# Patient Record
Sex: MALE | Employment: FULL TIME | URBAN - METROPOLITAN AREA
[De-identification: names, ages, dates, MRNs, and addresses within clinical notes are randomized per-mention and may not be internally consistent; named-entity substitution may affect disease eponyms.]

---

## 2020-08-03 RX ORDER — FOLIC ACID 1 MG/1
1 TABLET ORAL DAILY
COMMUNITY

## 2020-08-03 RX ORDER — IBUPROFEN 200 MG
200 TABLET ORAL EVERY 6 HOURS PRN
COMMUNITY

## 2020-08-10 ENCOUNTER — ANESTHESIA EVENT (OUTPATIENT)
Dept: RADIATION ONCOLOGY | Age: 35
End: 2020-08-10

## 2020-08-11 ENCOUNTER — ANESTHESIA (OUTPATIENT)
Dept: RADIATION ONCOLOGY | Age: 35
End: 2020-08-11

## 2020-08-11 ENCOUNTER — HOSPITAL ENCOUNTER (OUTPATIENT)
Dept: RADIATION ONCOLOGY | Age: 35
Discharge: HOME | End: 2020-08-11
Payer: COMMERCIAL

## 2020-08-11 ENCOUNTER — HOSPITAL ENCOUNTER (OUTPATIENT)
Dept: CT IMAGING | Age: 35
Discharge: HOME | End: 2020-08-11
Payer: COMMERCIAL

## 2020-08-11 ENCOUNTER — HOSPITAL ENCOUNTER (OUTPATIENT)
Dept: MRI IMAGING | Age: 35
Discharge: HOME | End: 2020-08-11
Payer: COMMERCIAL

## 2020-08-11 VITALS
DIASTOLIC BLOOD PRESSURE: 76 MMHG | RESPIRATION RATE: 18 BRPM | TEMPERATURE: 98.7 F | HEART RATE: 76 BPM | HEIGHT: 75 IN | SYSTOLIC BLOOD PRESSURE: 140 MMHG | BODY MASS INDEX: 23.87 KG/M2 | WEIGHT: 192 LBS | OXYGEN SATURATION: 99 %

## 2020-08-11 VITALS — SYSTOLIC BLOOD PRESSURE: 122 MMHG | RESPIRATION RATE: 12 BRPM | DIASTOLIC BLOOD PRESSURE: 70 MMHG | TEMPERATURE: 96.8 F

## 2020-08-11 PROCEDURE — 77371 SRS MULTISOURCE: CPT

## 2020-08-11 PROCEDURE — 2580000003 HC RX 258: Performed by: ANESTHESIOLOGY

## 2020-08-11 PROCEDURE — 7100000011 HC PHASE II RECOVERY - ADDTL 15 MIN

## 2020-08-11 PROCEDURE — 77334 RADIATION TREATMENT AID(S): CPT

## 2020-08-11 PROCEDURE — 7100000010 HC PHASE II RECOVERY - FIRST 15 MIN

## 2020-08-11 PROCEDURE — 3700000000 HC ANESTHESIA ATTENDED CARE

## 2020-08-11 PROCEDURE — 2500000003 HC RX 250 WO HCPCS: Performed by: NURSE ANESTHETIST, CERTIFIED REGISTERED

## 2020-08-11 PROCEDURE — 6360000004 HC RX CONTRAST MEDICATION: Performed by: NEUROLOGICAL SURGERY

## 2020-08-11 PROCEDURE — 70553 MRI BRAIN STEM W/O & W/DYE: CPT

## 2020-08-11 PROCEDURE — 77300 RADIATION THERAPY DOSE PLAN: CPT

## 2020-08-11 PROCEDURE — 77336 RADIATION PHYSICS CONSULT: CPT

## 2020-08-11 PROCEDURE — 70460 CT HEAD/BRAIN W/DYE: CPT

## 2020-08-11 PROCEDURE — 3700000001 HC ADD 15 MINUTES (ANESTHESIA)

## 2020-08-11 PROCEDURE — 2580000003 HC RX 258: Performed by: NEUROLOGICAL SURGERY

## 2020-08-11 PROCEDURE — A9576 INJ PROHANCE MULTIPACK: HCPCS | Performed by: NEUROLOGICAL SURGERY

## 2020-08-11 PROCEDURE — 2500000003 HC RX 250 WO HCPCS: Performed by: NEUROLOGICAL SURGERY

## 2020-08-11 PROCEDURE — 6360000002 HC RX W HCPCS: Performed by: NEUROLOGICAL SURGERY

## 2020-08-11 PROCEDURE — 77295 3-D RADIOTHERAPY PLAN: CPT

## 2020-08-11 PROCEDURE — 6360000002 HC RX W HCPCS: Performed by: NURSE ANESTHETIST, CERTIFIED REGISTERED

## 2020-08-11 RX ORDER — SODIUM CHLORIDE 0.9 % (FLUSH) 0.9 %
10 SYRINGE (ML) INJECTION PRN
Status: DISCONTINUED | OUTPATIENT
Start: 2020-08-11 | End: 2020-08-12 | Stop reason: HOSPADM

## 2020-08-11 RX ORDER — SODIUM CHLORIDE, SODIUM LACTATE, POTASSIUM CHLORIDE, CALCIUM CHLORIDE 600; 310; 30; 20 MG/100ML; MG/100ML; MG/100ML; MG/100ML
INJECTION, SOLUTION INTRAVENOUS CONTINUOUS
Status: DISCONTINUED | OUTPATIENT
Start: 2020-08-11 | End: 2020-08-12 | Stop reason: HOSPADM

## 2020-08-11 RX ORDER — ADALIMUMAB 40MG/0.4ML
40 KIT SUBCUTANEOUS
COMMUNITY

## 2020-08-11 RX ORDER — LIDOCAINE HYDROCHLORIDE 10 MG/ML
30 INJECTION, SOLUTION EPIDURAL; INFILTRATION; INTRACAUDAL; PERINEURAL ONCE
Status: COMPLETED | OUTPATIENT
Start: 2020-08-11 | End: 2020-08-11

## 2020-08-11 RX ORDER — DEXAMETHASONE SODIUM PHOSPHATE 4 MG/ML
4 INJECTION, SOLUTION INTRA-ARTICULAR; INTRALESIONAL; INTRAMUSCULAR; INTRAVENOUS; SOFT TISSUE ONCE
Status: COMPLETED | OUTPATIENT
Start: 2020-08-11 | End: 2020-08-11

## 2020-08-11 RX ORDER — SODIUM BICARBONATE 42 MG/ML
1.5 INJECTION, SOLUTION INTRAVENOUS ONCE
Status: COMPLETED | OUTPATIENT
Start: 2020-08-11 | End: 2020-08-11

## 2020-08-11 RX ORDER — BUPIVACAINE HYDROCHLORIDE 5 MG/ML
30 INJECTION, SOLUTION EPIDURAL; INTRACAUDAL ONCE
Status: COMPLETED | OUTPATIENT
Start: 2020-08-11 | End: 2020-08-11

## 2020-08-11 RX ORDER — LORAZEPAM 2 MG/ML
1 INJECTION INTRAMUSCULAR ONCE
Status: COMPLETED | OUTPATIENT
Start: 2020-08-11 | End: 2020-08-11

## 2020-08-11 RX ORDER — BACITRACIN, NEOMYCIN, POLYMYXIN B 400; 3.5; 5 [USP'U]/G; MG/G; [USP'U]/G
OINTMENT TOPICAL ONCE
Status: DISCONTINUED | OUTPATIENT
Start: 2020-08-11 | End: 2020-08-12 | Stop reason: HOSPADM

## 2020-08-11 RX ORDER — ONDANSETRON 2 MG/ML
4 INJECTION INTRAMUSCULAR; INTRAVENOUS ONCE
Status: COMPLETED | OUTPATIENT
Start: 2020-08-11 | End: 2020-08-11

## 2020-08-11 RX ORDER — LIDOCAINE HYDROCHLORIDE 20 MG/ML
INJECTION, SOLUTION INFILTRATION; PERINEURAL PRN
Status: DISCONTINUED | OUTPATIENT
Start: 2020-08-11 | End: 2020-08-11 | Stop reason: SDUPTHER

## 2020-08-11 RX ORDER — SODIUM CHLORIDE 9 MG/ML
INJECTION, SOLUTION INTRAVENOUS CONTINUOUS
Status: DISCONTINUED | OUTPATIENT
Start: 2020-08-11 | End: 2020-08-12 | Stop reason: HOSPADM

## 2020-08-11 RX ORDER — TRAZODONE HYDROCHLORIDE 50 MG/1
50 TABLET ORAL NIGHTLY PRN
COMMUNITY

## 2020-08-11 RX ORDER — FENTANYL CITRATE 50 UG/ML
INJECTION, SOLUTION INTRAMUSCULAR; INTRAVENOUS PRN
Status: DISCONTINUED | OUTPATIENT
Start: 2020-08-11 | End: 2020-08-11 | Stop reason: SDUPTHER

## 2020-08-11 RX ORDER — PROPOFOL 10 MG/ML
INJECTION, EMULSION INTRAVENOUS PRN
Status: DISCONTINUED | OUTPATIENT
Start: 2020-08-11 | End: 2020-08-11 | Stop reason: SDUPTHER

## 2020-08-11 RX ORDER — 0.9 % SODIUM CHLORIDE 0.9 %
500 INTRAVENOUS SOLUTION INTRAVENOUS ONCE
Status: COMPLETED | OUTPATIENT
Start: 2020-08-11 | End: 2020-08-11

## 2020-08-11 RX ORDER — METHYLPREDNISOLONE 4 MG/1
4 TABLET ORAL SEE ADMIN INSTRUCTIONS
COMMUNITY

## 2020-08-11 RX ORDER — LORAZEPAM 2 MG/ML
0.5 INJECTION INTRAMUSCULAR ONCE
Status: DISCONTINUED | OUTPATIENT
Start: 2020-08-11 | End: 2020-08-11

## 2020-08-11 RX ORDER — SODIUM CHLORIDE 0.9 % (FLUSH) 0.9 %
10 SYRINGE (ML) INJECTION EVERY 12 HOURS SCHEDULED
Status: DISCONTINUED | OUTPATIENT
Start: 2020-08-11 | End: 2020-08-12 | Stop reason: HOSPADM

## 2020-08-11 RX ADMIN — BUPIVACAINE HYDROCHLORIDE 150 MG: 5 INJECTION, SOLUTION EPIDURAL; INTRACAUDAL; PERINEURAL at 08:15

## 2020-08-11 RX ADMIN — LORAZEPAM 1 MG: 2 INJECTION INTRAMUSCULAR; INTRAVENOUS at 12:43

## 2020-08-11 RX ADMIN — ONDANSETRON 4 MG: 2 INJECTION INTRAMUSCULAR; INTRAVENOUS at 06:29

## 2020-08-11 RX ADMIN — PROPOFOL 50 MG: 10 INJECTION, EMULSION INTRAVENOUS at 08:01

## 2020-08-11 RX ADMIN — PROPOFOL 50 MG: 10 INJECTION, EMULSION INTRAVENOUS at 08:13

## 2020-08-11 RX ADMIN — GADOTERIDOL 20 ML: 279.3 INJECTION, SOLUTION INTRAVENOUS at 09:55

## 2020-08-11 RX ADMIN — SODIUM BICARBONATE 1.5 MEQ: 42 INJECTION, SOLUTION INTRAVENOUS at 08:15

## 2020-08-11 RX ADMIN — LIDOCAINE HYDROCHLORIDE 50 MG: 20 INJECTION, SOLUTION INFILTRATION; PERINEURAL at 08:10

## 2020-08-11 RX ADMIN — BUPIVACAINE HYDROCHLORIDE 150 MG: 5 INJECTION, SOLUTION EPIDURAL; INTRACAUDAL; PERINEURAL at 12:42

## 2020-08-11 RX ADMIN — FENTANYL CITRATE 25 MCG: 50 INJECTION, SOLUTION INTRAMUSCULAR; INTRAVENOUS at 08:03

## 2020-08-11 RX ADMIN — IOPAMIDOL 80 ML: 755 INJECTION, SOLUTION INTRAVENOUS at 10:52

## 2020-08-11 RX ADMIN — DEXAMETHASONE SODIUM PHOSPHATE 4 MG: 4 INJECTION, SOLUTION INTRA-ARTICULAR; INTRALESIONAL; INTRAMUSCULAR; INTRAVENOUS; SOFT TISSUE at 12:31

## 2020-08-11 RX ADMIN — SODIUM BICARBONATE 1.5 MEQ: 42 INJECTION, SOLUTION INTRAVENOUS at 12:42

## 2020-08-11 RX ADMIN — Medication 10 ML: at 08:31

## 2020-08-11 RX ADMIN — PROPOFOL 50 MG: 10 INJECTION, EMULSION INTRAVENOUS at 08:05

## 2020-08-11 RX ADMIN — PROPOFOL 50 MG: 10 INJECTION, EMULSION INTRAVENOUS at 08:09

## 2020-08-11 RX ADMIN — SODIUM CHLORIDE: 9 INJECTION, SOLUTION INTRAVENOUS at 07:58

## 2020-08-11 RX ADMIN — LIDOCAINE HYDROCHLORIDE 30 ML: 10 INJECTION, SOLUTION EPIDURAL; INFILTRATION; INTRACAUDAL; PERINEURAL at 08:15

## 2020-08-11 RX ADMIN — LIDOCAINE HYDROCHLORIDE 30 ML: 10 INJECTION, SOLUTION EPIDURAL; INFILTRATION; INTRACAUDAL; PERINEURAL at 12:41

## 2020-08-11 RX ADMIN — SODIUM CHLORIDE 500 ML: 9 INJECTION, SOLUTION INTRAVENOUS at 06:29

## 2020-08-11 RX ADMIN — LIDOCAINE HYDROCHLORIDE 50 MG: 20 INJECTION, SOLUTION INFILTRATION; PERINEURAL at 08:03

## 2020-08-11 RX ADMIN — PROPOFOL 50 MG: 10 INJECTION, EMULSION INTRAVENOUS at 08:18

## 2020-08-11 ASSESSMENT — PAIN SCALES - GENERAL: PAINLEVEL_OUTOF10: 5

## 2020-08-11 NOTE — OP NOTE
1 [facility]  PATIENT NAME:   Manjula Carrillo   MR #:  [id number]  YOB: 1985   ACCOUNT #:  [de-identified]  SURGEON:  Joey Iqbal M.D. ADMIT DATE:  8/11/2020  SERVICE:  Neurosurgery  DICTATED BY: Joey Iqbal M.D. SURGERY DATE:  8/11/2020           OPERATIVE REPORT       PREOPERATIVE DIAGNOSIS:     1. Left vestibular schwannoma (recurrent)     POSTOPERATIVE DIAGNOSIS:     1. Left vestibular schwannoma (recurrent)    PROCEDURE(S) PERFORMED:    1. Placement of stereotactic head frame   2. Gamma Knife radiosurgery for left vestibular schwannoma    NEUROSURGEON:  Joey Iqbal MD      RADIATION ONCOLOGIST: Jessica Winters MD    ANESTHESIA:  Moderate sedation    COMPLICATIONS:  None    INDICATIONS: This is a 18-year-old man with a left vestibular schwannoma who underwent suboccipital craniotomy and near-total resection by Dr. Merissa Shook on 5/31/17 (no hearing postop). He has been followed expectantly and the small tumor remnant has slowly enlarged. We discussed various treatment options but ultimately recommended Gamma Knife radiosurgery. The patient was aware of the potential benefits in terms of tumor control and the possible risks including (but not limited to): pin site bleeding/swelling/infection, facial weakness, facial numbness, brain swelling, new neurological symptoms, radiation injury (necrosis) of the brain, and/or secondary tumor formation. PERFORMANCE STATUS: 90%    DETAILS OF PROCEDURE: The four pin sites were cleaned with Chloraprep and injected with a mixture of Lidocaine 1%, Marcaine 0.5%, and sodium bicarbonate. The stereotactic head frame was secured with titanium screws. The patient underwent a stereotactic CT and MRI scans with contrast. These images were sent over the network to the [address] [place] [address]. The target and critical structures were contoured.  An optimal treatment plan was developed by the neurosurgeon, radiation oncologist, and medical physicist.    A cone-beam CT scan was performed in the treatment position to confirm frame stability. The patient then underwent Gamma Knife radiosurgery using the following parameters:    Target Size (cm) Shape Shots Dose (Gy) Isodose (%)   Left vestibular schwannoma 1.53 Oval 7 13 50     The patient tolerated the procedure without difficulty and the stereotactic frame was removed uneventfully. The patient was instructed on pin site care and medications.     SPECIMENS REMOVED: None    ESTIMATED BLOOD LOSS: None    TOTAL TIME SPENT WITH PATIENT: In conformance with CMS regulations, I affirm that I was present for the entire neurosurgical portion of the procedure including stereotactic frame placement, target definition, development of the treatment plan, treatment delivery, and stereotactic frame removal.     Jeri Briseno MD

## 2020-08-11 NOTE — PROGRESS NOTES
Checklist?  Yes    9. Does the patient require a pregnancy test per [address]? no     -If yes, the result was:  na    10. Are all present in agreement? Yes    Those present for time out:  Anisha Andrade RN. Dr. Gopal Morton, [address] [facility]  Patient received MAC under the supervision of Dr. MEDEL and Gina Pederson CRNA. This RN was present throughout [place] [address] and assumed care from anesthesia for Phase II recovery. The patient is awake and breathing easily. Patient denies pain. See Flow Sheet for vitals and Mark Score. Anisha Andrade RN      2911  After G-frame placement, patient was taken to MRI and CT by this RN where SpO2 and pulse were monitored and remained stable throughout. Patient tolerated the study well. Clara Maria RN      0481   Pt c/o pain 5/10 to posterior pin sites. Additional anesthetic injected by Dr. Gopal Morton to both posterior sites. Pt stated he felt relief shortly after. [facility] Time Out    1. Patient states name and birthdate correctly? Yes    2. Procedure listed on consent? Stereotactic Radiosurgery, Gamma Knife for LEFT Acoustic Neuroma    3. Is this the correct procedure? Yes    4. Does the patient have only one benign target or lesion? Yes    -If yes, what side are we treating? left    -If yes, is the side marked for laterality? yes    5. Has the final radiologist report been reviewed? yes    6. Has the patient received IV Dexamethasone prior to radiation delivery? yes    7. Does the patient require Keppra or Ativan prior to treatment delivery? Yes, ativan for sedation    8. Have the pin torques been rechecked? yes    9. Is a CBCT required prior to treatment delivery? No    10. Are all present in agreement? Yes    11. Those present for time out:  Christa Martinez MP, Dr. Gopal Morton, Anisha Andrade, [address]  Patient brought to [facility] and placed on treatment couch.  Patient instructed to perform ankle pumps during treatment. AV monitoring in place and verified verbally with patient from console. Continuous SpO2 and pulse monitor visible and monitored by this RN. 1345  After Gamma Knife procedure, the G-frame was removed by Clara ALMENDAREZ and Kelsea Francis RN and fixation pin sites were observed for bleeding. None was noted. Pin sites were cleaned with alcohol and povidone-iodine. Dr. Gracy Rosado then placed sutures at all four pin sites. Patient met Phase II discharge criteria. Baseline neurological status unchanged. See discharge Mark score and vitals. Discharge instructions were reviewed with patient and girlfriend, Terril Scheuermann, who verbalized understanding using teach back method. Pt instructed to follow Medrol Dose Pack steroid taper as prescribed. Patient will schedule follow up appointment in 6 months. Patient was accompanied to transportation by this RN.     Javi Rodriguez RN

## 2020-08-11 NOTE — PROGRESS NOTES
[facility]  GAMMA KNIFE RADIOSURGERY  PROCEDURE REPORT    PROCEDURE DATE:  8/11/2020              Gamma Knife Radiosurgery Procedure Note     Diagnosis: Recurrent left sided vestibular schwannoma    Description of procedure:     Juan Miguel Mendez was met at the [facility] today by Dr. Shirley Troy and myself. Conscious sedation was accomplished by anesthesia and frame placement was completed by Dr. Shirley Troy. A stereotactic MRI of the brain with double-dose contrast was then completed. The left sided vestibular schwannoma was identified and targeted. Dosimetry was reviewed by Dr. Jovon Hutchinson MS (special physics consult requested) and myself. Treatment was then given successfully. The frame was removed without complication. The patient was discharged home in stable condition with medication instructions and a follow appointment.     Vlad Thacker MD

## 2020-08-11 NOTE — ANESTHESIA PRE PROCEDURE
Department of Anesthesiology  Preprocedure Note       Name:  Concha Andrade   Age:  38 y.o.  :  1985                                          MRN:  [id number]         Date:  2020      Surgeon: * No surgeons listed *    Procedure: * No procedures listed *    Medications prior to admission:   Prior to Admission medications    Medication Sig Start Date End Date Taking? Authorizing Provider   Adalimumab (HUMIRA) 40 MG/0.4ML PSKT Inject 40 mg into the skin every 14 days   Yes Historical Provider, MD   traZODone (DESYREL) 50 MG tablet Take 50 mg by mouth nightly as needed for Sleep   Yes Historical Provider, MD   methylPREDNISolone (MEDROL DOSEPACK) 4 MG tablet Take 4 mg by mouth See Admin Instructions Take by mouth. Yes Historical Provider, MD   folic acid (FOLVITE) 1 MG tablet Take 1 mg by mouth daily   Yes Historical Provider, MD   ibuprofen (ADVIL;MOTRIN) 200 MG tablet Take 200 mg by mouth every 6 hours as needed for Pain   Yes Historical Provider, MD       Current medications:    Current Outpatient Medications   Medication Sig Dispense Refill    Adalimumab (HUMIRA) 40 MG/0.4ML PSKT Inject 40 mg into the skin every 14 days      traZODone (DESYREL) 50 MG tablet Take 50 mg by mouth nightly as needed for Sleep      methylPREDNISolone (MEDROL DOSEPACK) 4 MG tablet Take 4 mg by mouth See Admin Instructions Take by mouth.       folic acid (FOLVITE) 1 MG tablet Take 1 mg by mouth daily      ibuprofen (ADVIL;MOTRIN) 200 MG tablet Take 200 mg by mouth every 6 hours as needed for Pain       Current Facility-Administered Medications   Medication Dose Route Frequency Provider Last Rate Last Dose    bupivacaine (PF) (MARCAINE) 0.5 % injection 150 mg  30 mL Intradermal Once Carlos Enrique Wise MD        Dexamethasone Sodium Phosphate injection 4 mg  4 mg Intravenous Once Carlos Enrique Wise MD        lidocaine PF 1 % injection 30 mL  30 mL Intradermal Once Carlos Enrique Wise MD        sodium bicarbonate 4.2 % injection 1.5 mEq  1.5 mEq Intradermal Once Destini Thornton MD        sodium chloride flush 0.9 % injection 10 mL  10 mL Intravenous 2 times per day Aida Perez DO        sodium chloride flush 0.9 % injection 10 mL  10 mL Intravenous PRN Aida Perez DO        0.9 % sodium chloride infusion   Intravenous Continuous Aida Perez DO        lactated ringers infusion   Intravenous Continuous Aida Perez DO        neomycin-bacitracin-polymyxin (NEOSPORIN) ointment   Topical Once Destini Thornton MD           Allergies: Allergies   Allergen Reactions    Other      BEES    Ultram [Tramadol Hcl]        Problem List:  There is no problem list on file for this patient. Past Medical History:  History reviewed. No pertinent past medical history. Past Surgical History:        Procedure Laterality Date    CRANIOTOMY Left     schwannoma       Social History:    Social History     Tobacco Use    Smoking status: Unknown If Ever Smoked   Substance Use Topics    Alcohol use: Not on file                                Counseling given: Not Answered      Vital Signs (Current):   Vitals:    08/11/20 0559 08/11/20 0607   BP:  128/78   Pulse:  82   Resp:  18   Temp:  97.5 °F (36.4 °C)   TempSrc:  Skin   SpO2:  96%   Weight: 192 lb (87.1 kg)    Height:  6' 3\" (1.905 m)                                              BP Readings from Last 3 Encounters:   08/11/20 128/78       NPO Status:                                                                                 BMI:   Wt Readings from Last 3 Encounters:   08/11/20 192 lb (87.1 kg)     Body mass index is 24 kg/m². CBC: No results found for: WBC, RBC, HGB, HCT, MCV, RDW, PLT    CMP: No results found for: NA, K, CL, CO2, BUN, CREATININE, GFRAA, AGRATIO, LABGLOM, GLUCOSE, PROT, CALCIUM, BILITOT, ALKPHOS, AST, ALT    POC Tests: No results for input(s): POCGLU, POCNA, POCK, POCCL, POCBUN, POCHEMO, POCHCT in the last 72 hours.     Coags: No results found for: PROTIME, INR, APTT    HCG (If Applicable): No results found for: PREGTESTUR, PREGSERUM, HCG, HCGQUANT     ABGs: No results found for: PHART, PO2ART, SZB8SUN, DCO4ZAN, BEART, [id number]     Type & Screen (If Applicable):  No results found for: LABABO, LABRH    Drug/Infectious Status (If Applicable):  No results found for: HIV, HEPCAB    COVID-19 Screening (If Applicable): No results found for: COVID19      Anesthesia Evaluation  Patient summary reviewed and Nursing notes reviewed no history of anesthetic complications:   Airway: Mallampati: II  TM distance: >3 FB   Neck ROM: full  Mouth opening: > = 3 FB Dental:          Pulmonary:Negative Pulmonary ROS                              Cardiovascular:Negative CV ROS                      Neuro/Psych:                ROS comment: Left-sided acoustic neuroma GI/Hepatic/Renal: Neg GI/Hepatic/Renal ROS            Endo/Other: Negative Endo/Other ROS                    Abdominal:           Vascular: negative vascular ROS. Anesthesia Plan      MAC     ASA 3    (53-year-old male presents for application of G-frame for gamma knife radiation procedure. Plan MAC with ASA standard monitors. Questions answered. Patient agreeable with anesthetic plan.  )  Induction: intravenous. Anesthetic plan and risks discussed with patient. Plan discussed with CRNA.     Attending anesthesiologist reviewed and agrees with Pre Eval content              Kerri Magana MD   8/11/2020

## 2020-08-11 NOTE — H&P
There have been no changes in the patient's condition since the history and physical.    Isra Null.  Popeye Hooper MD

## 2020-08-11 NOTE — ANESTHESIA POSTPROCEDURE EVALUATION
Department of Anesthesiology  Postprocedure Note    Patient: Kal Chowdhury  MRN: [id number]  YOB: 1985  Date of evaluation: 8/11/2020  Time:  4:18 PM     Procedure Summary     Date:  08/11/20 Room / Location:  [address] [place] [address]    Anesthesia Start:  0758 Anesthesia Stop:  0825    Procedure:  Chet Husbands W ANESTHESIA Diagnosis:  Benign neoplasm of cranial nerves    Scheduled Providers:  Amanda Oconnor MD Responsible Provider:  Amanda Oconnor MD    Anesthesia Type:  MAC ASA Status:  2          Anesthesia Type: MAC    Mark Phase I:      Mark Phase II:      Last vitals: Reviewed and per EMR flowsheets.        Anesthesia Post Evaluation    Patient location during evaluation: PACU  Patient participation: complete - patient participated  Level of consciousness: awake and alert  Pain score: 0  Airway patency: patent  Nausea & Vomiting: no nausea and no vomiting  Complications: no  Cardiovascular status: hemodynamically stable  Respiratory status: acceptable  Hydration status: euvolemic

## 2020-08-25 ENCOUNTER — HOSPITAL ENCOUNTER (OUTPATIENT)
Dept: RADIATION ONCOLOGY | Age: 35
Discharge: HOME | End: 2020-08-25
Payer: COMMERCIAL

## 2020-08-25 ENCOUNTER — PRE-PROCEDURE TELEPHONE (OUTPATIENT)
Dept: RADIATION ONCOLOGY | Age: 35
End: 2020-08-25

## 2020-08-25 NOTE — PROGRESS NOTES
Two week follow-up phone call with patient has been completed. 1. Pt denies any issues with the pin sites, states that the sutures have almost dissolved. 2. Successfully completed steroid taper with no issues  3. The pt stated that his pin sites are \"itchy\" and \"burny\" He denies any redness, swelling or drainage. He also said that he periodically gets pin point pain to other parts of his skull that can last a 2-30 seconds. Nothing specific triggers them and nothing but time alleviates them. They happen several times a day. \"it feels like someone is poking me with a push pin. \"  I informed him that I would let Dr. Ale Lee know and will follow up with the patient afterwards. 4.   Has been reminded of their next appointment with Dr. Ale Lee in 6 months. Thank you for referring this patient to the [facility] Department for treatment, it has been a pleasure to participate in their care. If the patient requires future Gamma Knife procedures please contact the department directly at [phone].

## 2021-03-25 ENCOUNTER — HOSPITAL ENCOUNTER (OUTPATIENT)
Dept: MRI IMAGING | Age: 36
Discharge: HOME | End: 2021-03-25
Payer: COMMERCIAL

## 2021-03-25 DIAGNOSIS — D36.10 SCHWANNOMA: ICD-10-CM

## 2021-03-25 PROCEDURE — 70553 MRI BRAIN STEM W/O & W/DYE: CPT

## 2021-03-25 PROCEDURE — 6360000004 HC RX CONTRAST MEDICATION: Performed by: NEUROLOGICAL SURGERY

## 2021-03-25 PROCEDURE — A9576 INJ PROHANCE MULTIPACK: HCPCS | Performed by: NEUROLOGICAL SURGERY

## 2021-03-25 RX ADMIN — GADOTERIDOL 19 ML: 279.3 INJECTION, SOLUTION INTRAVENOUS at 16:36

## 2021-09-23 ENCOUNTER — HOSPITAL ENCOUNTER (OUTPATIENT)
Dept: MRI IMAGING | Age: 36
Discharge: HOME | End: 2021-09-23
Payer: COMMERCIAL

## 2021-09-23 DIAGNOSIS — D33.3 BENIGN NEOPLASM OF CRANIAL NERVES (HCC): ICD-10-CM

## 2021-09-23 PROCEDURE — 70553 MRI BRAIN STEM W/O & W/DYE: CPT

## 2021-09-23 PROCEDURE — 6360000004 HC RX CONTRAST MEDICATION: Performed by: NEUROLOGICAL SURGERY

## 2021-09-23 PROCEDURE — A9576 INJ PROHANCE MULTIPACK: HCPCS | Performed by: NEUROLOGICAL SURGERY

## 2021-09-23 RX ADMIN — GADOTERIDOL 19 ML: 279.3 INJECTION, SOLUTION INTRAVENOUS at 16:09

## 2022-09-22 ENCOUNTER — HOSPITAL ENCOUNTER (OUTPATIENT)
Dept: MRI IMAGING | Age: 37
Discharge: HOME | End: 2022-09-22
Payer: COMMERCIAL

## 2022-09-22 DIAGNOSIS — D33.3 ACOUSTIC NEUROMA (HCC): ICD-10-CM

## 2022-09-22 PROCEDURE — A9579 GAD-BASE MR CONTRAST NOS,1ML: HCPCS | Performed by: NEUROLOGICAL SURGERY

## 2022-09-22 PROCEDURE — 70553 MRI BRAIN STEM W/O & W/DYE: CPT

## 2022-09-22 PROCEDURE — 6360000004 HC RX CONTRAST MEDICATION: Performed by: NEUROLOGICAL SURGERY

## 2022-09-22 RX ADMIN — GADOTERIDOL 19 ML: 279.3 INJECTION, SOLUTION INTRAVENOUS at 15:13

## 2023-09-28 ENCOUNTER — HOSPITAL ENCOUNTER (OUTPATIENT)
Dept: MRI IMAGING | Age: 38
Discharge: HOME | End: 2023-09-28
Attending: NEUROLOGICAL SURGERY
Payer: COMMERCIAL

## 2023-09-28 DIAGNOSIS — D36.10 SCHWANNOMA: ICD-10-CM

## 2023-09-28 PROCEDURE — 6360000004 HC RX CONTRAST MEDICATION: Performed by: NEUROLOGICAL SURGERY

## 2023-09-28 PROCEDURE — A9579 GAD-BASE MR CONTRAST NOS,1ML: HCPCS | Performed by: NEUROLOGICAL SURGERY

## 2023-09-28 PROCEDURE — 70553 MRI BRAIN STEM W/O & W/DYE: CPT

## 2023-09-28 RX ADMIN — GADOTERIDOL 19 ML: 279.3 INJECTION, SOLUTION INTRAVENOUS at 17:39

## 2025-01-21 ENCOUNTER — TRANSCRIBE ORDERS (OUTPATIENT)
Dept: ADMINISTRATIVE | Age: 40
End: 2025-01-21

## 2025-01-21 DIAGNOSIS — R22.0 INTRACRANIAL SWELLING: ICD-10-CM

## 2025-01-21 DIAGNOSIS — D36.10 SCHWANNOMA: Primary | ICD-10-CM

## 2025-01-23 ENCOUNTER — HOSPITAL ENCOUNTER (OUTPATIENT)
Dept: MRI IMAGING | Age: 40
Discharge: HOME | End: 2025-01-23
Attending: NEUROLOGICAL SURGERY
Payer: COMMERCIAL

## 2025-01-23 DIAGNOSIS — D36.10 SCHWANNOMA: ICD-10-CM

## 2025-01-23 DIAGNOSIS — R22.0 INTRACRANIAL SWELLING: ICD-10-CM

## 2025-01-23 PROCEDURE — A9576 INJ PROHANCE MULTIPACK: HCPCS | Performed by: NEUROLOGICAL SURGERY

## 2025-01-23 PROCEDURE — 6360000004 HC RX CONTRAST MEDICATION: Performed by: NEUROLOGICAL SURGERY

## 2025-01-23 PROCEDURE — 70543 MRI ORBT/FAC/NCK W/O &W/DYE: CPT

## 2025-01-23 RX ADMIN — GADOTERIDOL 19 ML: 279.3 INJECTION, SOLUTION INTRAVENOUS at 13:09
